# Patient Record
Sex: FEMALE | Race: OTHER | HISPANIC OR LATINO | Employment: OTHER | URBAN - METROPOLITAN AREA
[De-identification: names, ages, dates, MRNs, and addresses within clinical notes are randomized per-mention and may not be internally consistent; named-entity substitution may affect disease eponyms.]

---

## 2020-07-09 ENCOUNTER — TELEPHONE (OUTPATIENT)
Dept: NEUROLOGY | Facility: CLINIC | Age: 50
End: 2020-07-09
Payer: MEDICARE

## 2020-07-09 NOTE — TELEPHONE ENCOUNTER
----- Message from [facility] sent at 7/9/2020  1:09 PM CDT -----  Regarding: Stroke Referral  Good Afternoon,    Dr. Ruffin would like to refer the following patient to Dr. Zamora. The patients diagnosis is cerebral aneurysm. I have scanned the patients referral and records into .      Please let me know if I can help schedule in any way.    Thank you,   [facility] Renu

## 2020-07-17 ENCOUNTER — TELEPHONE (OUTPATIENT)
Dept: NEUROSURGERY | Facility: CLINIC | Age: 50
End: 2020-07-17

## 2020-07-17 NOTE — TELEPHONE ENCOUNTER
I contacted the pt regarding scheduling an appt for evaluation of her newly diagnosis of cerebral aneurysm. I was unable to reach the pt or LM. I scheduled the pt and placed appt slip in the mail.

## 2020-07-27 ENCOUNTER — OFFICE VISIT (OUTPATIENT)
Dept: NEUROSURGERY | Facility: CLINIC | Age: 50
End: 2020-07-27
Payer: MEDICARE

## 2020-07-27 VITALS
HEIGHT: 66 IN | DIASTOLIC BLOOD PRESSURE: 84 MMHG | WEIGHT: 252.19 LBS | TEMPERATURE: 99 F | HEART RATE: 82 BPM | BODY MASS INDEX: 40.53 KG/M2 | SYSTOLIC BLOOD PRESSURE: 133 MMHG

## 2020-07-27 DIAGNOSIS — I67.1 CEREBRAL ANEURYSM, NONRUPTURED: Primary | ICD-10-CM

## 2020-07-27 DIAGNOSIS — I67.1 CEREBRAL ANEURYSM, NONRUPTURED: ICD-10-CM

## 2020-07-27 DIAGNOSIS — I67.1 CEREBRAL ANEURYSM: Primary | ICD-10-CM

## 2020-07-27 DIAGNOSIS — I67.1 CEREBRAL ANEURYSM: ICD-10-CM

## 2020-07-27 DIAGNOSIS — G44.89 OTHER HEADACHE SYNDROME: ICD-10-CM

## 2020-07-27 DIAGNOSIS — I10 ESSENTIAL HYPERTENSION: ICD-10-CM

## 2020-07-27 PROCEDURE — 3008F PR BODY MASS INDEX (BMI) DOCUMENTED: ICD-10-PCS | Mod: CPTII,S$GLB,, | Performed by: PSYCHIATRY & NEUROLOGY

## 2020-07-27 PROCEDURE — 3008F BODY MASS INDEX DOCD: CPT | Mod: CPTII,S$GLB,, | Performed by: PSYCHIATRY & NEUROLOGY

## 2020-07-27 PROCEDURE — 99205 PR OFFICE/OUTPT VISIT, NEW, LEVL V, 60-74 MIN: ICD-10-PCS | Mod: S$GLB,,, | Performed by: PSYCHIATRY & NEUROLOGY

## 2020-07-27 PROCEDURE — 99999 PR PBB SHADOW E&M-EST. PATIENT-LVL III: ICD-10-PCS | Mod: PBBFAC,,, | Performed by: PSYCHIATRY & NEUROLOGY

## 2020-07-27 PROCEDURE — 99999 PR PBB SHADOW E&M-EST. PATIENT-LVL III: CPT | Mod: PBBFAC,,, | Performed by: PSYCHIATRY & NEUROLOGY

## 2020-07-27 PROCEDURE — 99205 OFFICE O/P NEW HI 60 MIN: CPT | Mod: S$GLB,,, | Performed by: PSYCHIATRY & NEUROLOGY

## 2020-07-27 RX ORDER — SULFAMETHOXAZOLE AND TRIMETHOPRIM 800; 160 MG/1; MG/1
1 TABLET ORAL
COMMUNITY

## 2020-07-27 RX ORDER — LISINOPRIL 10 MG/1
TABLET ORAL
COMMUNITY
Start: 2020-07-16

## 2020-07-27 RX ORDER — BUPROPION HYDROCHLORIDE 150 MG/1
TABLET ORAL
COMMUNITY
Start: 2020-07-15

## 2020-07-27 RX ORDER — AMITRIPTYLINE HYDROCHLORIDE 50 MG/1
50 TABLET, FILM COATED ORAL NIGHTLY
COMMUNITY
End: 2020-08-10

## 2020-07-27 RX ORDER — ALPRAZOLAM 0.25 MG/1
0.25 TABLET ORAL 2 TIMES DAILY
COMMUNITY
Start: 2020-07-13

## 2020-07-27 RX ORDER — ESZOPICLONE 3 MG/1
TABLET, FILM COATED ORAL
COMMUNITY
Start: 2020-07-13

## 2020-07-27 RX ORDER — MULTIVIT WITH MINERALS/HERBS
1 TABLET ORAL DAILY
COMMUNITY

## 2020-07-27 RX ORDER — ASPIRIN 81 MG/1
81 TABLET ORAL DAILY
COMMUNITY

## 2020-07-27 RX ORDER — ONDANSETRON 4 MG/1
8 TABLET, ORALLY DISINTEGRATING ORAL
COMMUNITY

## 2020-07-27 RX ORDER — BUTALBITAL, ACETAMINOPHEN AND CAFFEINE 50; 325; 40 MG/1; MG/1; MG/1
TABLET ORAL
COMMUNITY
Start: 2020-07-17

## 2020-07-27 RX ORDER — ATORVASTATIN CALCIUM 20 MG/1
TABLET, FILM COATED ORAL
COMMUNITY
Start: 2020-07-16

## 2020-07-27 NOTE — ASSESSMENT & PLAN NOTE
Unruptured Left ICA aneurysm s/ p embolization with WEB in Feb 2020    - will perform follow up 6 mo cerebral angiogram in Aug 2020.  - rec SBP < 130 mmHg   - risk factor modification

## 2020-07-27 NOTE — LETTER
August 4, 2020      Della Alvarenga NP  [address] [facility] [address]           Keanu mayela - Neurosurgery 7th Fl  [address]  [facility] [address]  Phone: [phone]  Fax: [phone]          Patient: Malena Marie   MR Number: [id number]   YOB: 1970   Date of Visit: 7/27/2020       Dear Della Alvarenga:    Thank you for referring Malena Marie to me for evaluation. Attached you will find relevant portions of my assessment and plan of care.    If you have questions, please do not hesitate to call me. I look forward to following Malena Marie along with you.    Sincerely,    Jorge Luis Zamora MD    Enclosure  CC:  No Recipients    If you would like to receive this communication electronically, please contact [email or web address] or [phone] to request more information on [employer] access.    For providers and/or their staff who would like to refer a patient to [facility], please contact us through our one-stop-shop provider referral line, [facility] [place], at [phone].    If you feel you have received this communication in error or would no longer like to receive these types of communications, please e-mail [email or web address]

## 2020-07-27 NOTE — PROGRESS NOTES
Interventional Neurosurgery  Clinic Note    ___________________  ASSESSMENT & PLAN    Problem List Items Addressed This Visit        Neuro    Cerebral aneurysm, nonruptured    Current Assessment & Plan     Unruptured Left ICA aneurysm s/ p embolization with WEB in Feb 2020    - will perform follow up 6 mo cerebral angiogram in Aug 2020.  - rec SBP < 130 mmHg   - risk factor modification         Other headache syndrome    Current Assessment & Plan     - daily headache     - rec to reduce Fioricet intake to < 4 a month   - will arrange follow up with Headache clinic   - Pt currently getting Botox with Dr. Barrera.                 Cardiac/Vascular    Essential hypertension    Current Assessment & Plan     - rec long term SBP < 130 mmHg  - exercise and mediterranean diet            Other Visit Diagnoses     Cerebral aneurysm    -  Primary    Relevant Orders    IR Angiogram Carotid Cerebral Bilateral inc Arch          Reason For Visit (Chief Complaint): follow up post cerebral aneurysm embolization    HPI: 50 y.o.  female presented to the clinic for follow up post Left ICA aneurysm WEB embolization.      Pt reports having incidental left ICA aneurysm treated by Dr. Soriano @ [place] with WEB device in February, 2020. Pt in clinic today for follow up evaluation.     She reports of daily headache. She has to take Fioricet + Advil daily to help with the headache. She also gets Botox for headache which helps her headache.         Vessel Imaging:    Pt is going to send images for the embolizaed WEB device         Relevant Labwork:        No images available   I reviewed the above labwork.    Review of Systems   Constitutional: Negative for chills and fever.   HENT: Negative for hearing loss and tinnitus.    Eyes: Negative for blurred vision and double vision.   Respiratory: Negative for cough and hemoptysis.    Cardiovascular: Negative for chest pain and palpitations.   Gastrointestinal: Negative for heartburn and  "nausea.   Genitourinary: Negative for dysuria and urgency.   Musculoskeletal: Positive for back pain . Negative for myalgias and neck pain.   Skin: Negative for itching and rash.   Neurological: Negative for dizziness and positive for headaches.   Endo/Heme/Allergies: Negative for environmental allergies. Does not bruise/bleed easily.   Psychiatric/Behavioral: Negative for depression and suicidal ideas.     Past Medical History  Past Medical History:   Diagnosis Date    Hypertension      Family History  No relevant history   Social History  Never Smoker     Medication List with Changes/Refills   Current Medications    ALPRAZOLAM (XANAX) 0.25 MG TABLET    Take 0.25 mg by mouth 2 (two) times daily. as needed for anxiety.    AMITRIPTYLINE (ELAVIL) 50 MG TABLET    Take 50 mg by mouth every evening.    ASCORBIC ACID, VITAMIN C, (VITAMIN C) 100 MG TABLET    Take 100 mg by mouth once daily.    ASPIRIN (ECOTRIN) 81 MG EC TABLET    Take 81 mg by mouth once daily.    ATORVASTATIN (LIPITOR) 20 MG TABLET        B COMPLEX VITAMINS TABLET    Take 1 tablet by mouth once daily.    BUPROPION (WELLBUTRIN XL) 150 MG TB24 TABLET        BUTALBITAL-ACETAMINOPHEN-CAFFEINE -40 MG (FIORICET, ESGIC) -40 MG PER TABLET    TAKE 1 TABLET BY MOUTH EVERY 8 HOURS AS NEEDED FOR HEADACHE MUST LAST 30 DAYS    ESZOPICLONE (LUNESTA) 3 MG TAB    TAKE 1 TABLET BY MOUTH ONCE DAILY AT BEDTIME AS NEEDED FOR INSOMNIA MUST LAST 30 DAYS    LISINOPRIL 10 MG TABLET        ONDANSETRON (ZOFRAN-ODT) 4 MG TBDL    Take 8 mg by mouth every 12 (twelve) hours.    SULFAMETHOXAZOLE-TRIMETHOPRIM 800-160MG (BACTRIM DS) 800-160 MG TAB    Take 1 tablet by mouth every 12 (twelve) hours.       EXAM  Vital Signs:Blood pressure 133/84, pulse 82, temperature 98.6 °F (37 °C), temperature source Temporal, height 5' 6" (1.676 m), weight 114.4 kg (252 lb 3.3 oz).  General: well appearing without discomfort   Mental Status:alert, oriented to person - place - age - month "   Language: able to name, repeat, comprehend commands   Cranial Nerves: EOMI, PERRL, no facial asymmetry, tongue to midline, palate midline  Motor: 5/5 power in all extremities, normal tone  Sensory: intact light touch bilaterally  Coordination: no ataxia   Gait & Stance: normal        Manoj Mijares MD  NeuroIR fellow

## 2020-07-27 NOTE — ASSESSMENT & PLAN NOTE
- daily headache     - rec to reduce Fioricet intake to < 4 a month   - will arrange follow up with Headache clinic   - Pt currently getting Botox with Dr. Barrera.

## 2020-07-28 DIAGNOSIS — I67.1 CEREBRAL ANEURYSM, NONRUPTURED: Primary | ICD-10-CM

## 2020-07-28 DIAGNOSIS — R79.1 ABNORMAL COAGULATION PROFILE: ICD-10-CM

## 2020-07-28 RX ORDER — MIDAZOLAM HYDROCHLORIDE 1 MG/ML
1 INJECTION INTRAMUSCULAR; INTRAVENOUS
Status: CANCELLED | OUTPATIENT
Start: 2020-07-28

## 2020-07-28 RX ORDER — HEPARIN SODIUM 1000 [USP'U]/ML
5000 INJECTION, SOLUTION INTRAVENOUS; SUBCUTANEOUS ONCE
Status: CANCELLED | OUTPATIENT
Start: 2020-07-28 | End: 2020-07-28

## 2020-07-28 RX ORDER — FENTANYL CITRATE 50 UG/ML
50 INJECTION, SOLUTION INTRAMUSCULAR; INTRAVENOUS
Status: CANCELLED | OUTPATIENT
Start: 2020-07-28

## 2020-07-28 NOTE — NURSING
Pre-op call complete.     Pre procedure instructions given for Cerebral angiogram via  (Jose). Pt instructed not to eat or drink after midnight. Allergies reviewed. Pts friend [employer] to provide transport and monitor pt 8 hrs. Home medications reviewed with patient. Pt instructed to check in to second floor radiology/lab desk to have blood work drawn at 10am then to proceed to [facility] waiting area. Pt denied postioning restrictions, HOB flat 2-4 hrs.  Expected length of stay reviewed.  Covid screening complete.  Pt verbalizes understanding. Questions answered.

## 2020-07-29 ENCOUNTER — HOSPITAL ENCOUNTER (OUTPATIENT)
Facility: HOSPITAL | Age: 50
Discharge: HOME | End: 2020-07-29
Attending: PSYCHIATRY & NEUROLOGY | Admitting: PSYCHIATRY & NEUROLOGY
Payer: MEDICARE

## 2020-07-29 VITALS
TEMPERATURE: 99 F | HEIGHT: 66 IN | WEIGHT: 253 LBS | HEART RATE: 64 BPM | OXYGEN SATURATION: 100 % | SYSTOLIC BLOOD PRESSURE: 156 MMHG | RESPIRATION RATE: 18 BRPM | DIASTOLIC BLOOD PRESSURE: 87 MMHG | BODY MASS INDEX: 40.66 KG/M2

## 2020-07-29 DIAGNOSIS — I67.1 CEREBRAL ANEURYSM: ICD-10-CM

## 2020-07-29 PROCEDURE — 63600175 PHARM REV CODE 636 W HCPCS: Performed by: PSYCHIATRY & NEUROLOGY

## 2020-07-29 PROCEDURE — 25500020 PHARM REV CODE 255: Performed by: PSYCHIATRY & NEUROLOGY

## 2020-07-29 PROCEDURE — 25000003 PHARM REV CODE 250: Performed by: PHYSICIAN ASSISTANT

## 2020-07-29 RX ORDER — FENTANYL CITRATE 50 UG/ML
INJECTION, SOLUTION INTRAMUSCULAR; INTRAVENOUS CODE/TRAUMA/SEDATION MEDICATION
Status: COMPLETED | OUTPATIENT
Start: 2020-07-29 | End: 2020-07-29

## 2020-07-29 RX ORDER — SODIUM CHLORIDE 0.9 % (FLUSH) 0.9 %
10 SYRINGE (ML) INJECTION
Status: DISCONTINUED | OUTPATIENT
Start: 2020-07-29 | End: 2020-07-29 | Stop reason: HOSPADM

## 2020-07-29 RX ORDER — MIDAZOLAM HYDROCHLORIDE 1 MG/ML
INJECTION INTRAMUSCULAR; INTRAVENOUS CODE/TRAUMA/SEDATION MEDICATION
Status: COMPLETED | OUTPATIENT
Start: 2020-07-29 | End: 2020-07-29

## 2020-07-29 RX ORDER — SODIUM CHLORIDE 9 MG/ML
INJECTION, SOLUTION INTRAVENOUS CONTINUOUS
Status: DISCONTINUED | OUTPATIENT
Start: 2020-07-29 | End: 2020-07-29 | Stop reason: HOSPADM

## 2020-07-29 RX ORDER — IODIXANOL 320 MG/ML
200 INJECTION, SOLUTION INTRAVASCULAR
Status: COMPLETED | OUTPATIENT
Start: 2020-07-29 | End: 2020-07-29

## 2020-07-29 RX ADMIN — MIDAZOLAM HYDROCHLORIDE 1 MG: 1 INJECTION, SOLUTION INTRAMUSCULAR; INTRAVENOUS at 03:07

## 2020-07-29 RX ADMIN — SODIUM CHLORIDE: 0.9 INJECTION, SOLUTION INTRAVENOUS at 02:07

## 2020-07-29 RX ADMIN — FENTANYL CITRATE 50 MCG: 50 INJECTION, SOLUTION INTRAMUSCULAR; INTRAVENOUS at 03:07

## 2020-07-29 RX ADMIN — IODIXANOL 85 ML: 320 INJECTION, SOLUTION INTRAVASCULAR at 04:07

## 2020-07-29 NOTE — PLAN OF CARE
[facility]  NAD noted.  Report received from Karen Sanders.  ENRICOG to SHARON ennis.  Will continue to monitor.       Spoke with Vivian Soto CNM remotely. Telephone order received with an OK to refill butalbital-acetaminophen  mg  Quantity #30, No refills.   Called patient and left message on VM stating refill has been approved.   Refill called to pharmacy station in [place].

## 2020-07-29 NOTE — H&P
Radiology History & Physical      SUBJECTIVE:     Chief Complaint: follow up cerebral angiogram    History of Present Illness:  Malena Marie is a 50 y.o. female who presents for follow up cerebral angiogram in patient with left ICA aneurysm embolization in 2020.     Past Medical History:   Diagnosis Date    Depression     Hypertension      Past Surgical History:   Procedure Laterality Date    BREAST SURGERY Right     lipoma     SECTION      HYSTERECTOMY      LIPOMA RESECTION      back       Home Meds:   Prior to Admission medications    Medication Sig Start Date End Date Taking? Authorizing Provider   ALPRAZolam (XANAX) 0.25 MG tablet Take 0.25 mg by mouth 2 (two) times daily. as needed for anxiety. 20  Yes Historical Provider, MD   amitriptyline (ELAVIL) 50 MG tablet Take 50 mg by mouth every evening.   Yes Historical Provider, MD   ascorbic acid, vitamin C, (VITAMIN C) 100 MG tablet Take 100 mg by mouth once daily.   Yes Historical Provider, MD   aspirin (ECOTRIN) 81 MG EC tablet Take 81 mg by mouth once daily.   Yes Historical Provider, MD   atorvastatin (LIPITOR) 20 MG tablet  20  Yes Historical Provider, MD   b complex vitamins tablet Take 1 tablet by mouth once daily.   Yes Historical Provider, MD   buPROPion (WELLBUTRIN XL) 150 MG TB24 tablet  7/15/20  Yes Historical Provider, MD   butalbital-acetaminophen-caffeine -40 mg (FIORICET, ESGIC) -40 mg per tablet TAKE 1 TABLET BY MOUTH EVERY 8 HOURS AS NEEDED FOR HEADACHE MUST LAST 30 DAYS 20  Yes Historical Provider, MD   eszopiclone (LUNESTA) 3 mg Tab TAKE 1 TABLET BY MOUTH ONCE DAILY AT BEDTIME AS NEEDED FOR INSOMNIA MUST LAST 30 DAYS 20  Yes Historical Provider, MD   lisinopriL 10 MG tablet  20  Yes Historical Provider, MD   ondansetron (ZOFRAN-ODT) 4 MG TbDL Take 8 mg by mouth every 12 (twelve) hours.   Yes Historical Provider, MD   sulfamethoxazole-trimethoprim 800-160mg (BACTRIM DS) 800-160 mg Tab  Take 1 tablet by mouth every 12 (twelve) hours.   Yes Historical Provider, MD     Anticoagulants/Antiplatelets: no anticoagulation    Allergies: Review of patient's allergies indicates:  No Known Allergies  Sedation History:  no adverse reactions    Review of Systems:   Hematological: no known coagulopathies  Respiratory: no shortness of breath  Cardiovascular: no chest pain  Gastrointestinal: no abdominal pain  Genito-Urinary: no dysuria  Musculoskeletal: negative  Neurological: no TIA or stroke symptoms         OBJECTIVE:     Vital Signs (Most Recent)  Temp: 98.8 °F (37.1 °C) (07/29/20 1411)  Pulse: 74 (07/29/20 1411)  Resp: 18 (07/29/20 1411)  BP: 131/75 (07/29/20 1411)  SpO2: 99 % (07/29/20 1411)    Physical Exam:  ASA: 2  Mallampati: 2    General: no acute distress  Mental Status: alert and oriented to person, place and time  HEENT: normocephalic, atraumatic  Chest: unlabored breathing  Heart: regular heart rate  Abdomen: nondistended  Extremity: moves all extremities    Laboratory  Lab Results   Component Value Date    INR 0.9 07/29/2020       Lab Results   Component Value Date    WBC 6.68 07/29/2020    HGB 14.8 07/29/2020    HCT 45.8 07/29/2020    MCV 96 07/29/2020     07/29/2020      Lab Results   Component Value Date     07/29/2020     07/29/2020    K 4.3 07/29/2020     07/29/2020    CO2 27 07/29/2020    BUN 12 07/29/2020    CREATININE 0.8 07/29/2020    CALCIUM 9.4 07/29/2020    ALT 30 07/29/2020    AST 17 07/29/2020    ALBUMIN 4.2 07/29/2020    BILITOT 0.5 07/29/2020       ASSESSMENT/PLAN:     Sedation Plan: moderate sedation  Patient will undergo : diagnostic cerebral angigoram.    Manoj Mijares MD  NeuroIR fellow.

## 2020-07-29 NOTE — PROCEDURES
Neurointerventional Radiology Post-Procedure Note    Pre Op Diagnosis: Left ICA terminus aneurysm s/p WEB embolization     Post Op Diagnosis: Same as above     Procedure: Cerebral angiogram    Procedure performed by: Noah ADAMS, Jorge Luis Mijares MD.    Written Informed Consent Obtained: Yes    Specimen Removed: NO    Estimated Blood Loss: less than 50     Procedure report:     A 5F sheath was placed into the right femoral artery and a 5F Chaim catheter was advanced into the aortic arch.  The bilateral ICA and vertebral arteries were subselected and angiography of the brain was performed after injection into each of these vessels.    Preliminary interpretation: Previously successfully embolized left ICA terminus aneurysm with WEB device. Please see Imaging report for full details.    A right femoral artery angiogram was performed, the sheath removed and hemostasis achieved using Angisoeal.  No hematoma was present at the time of hemostasis.    The patient tolerated the procedure well.     Manoj Mijares MD  NeuroInterventional Radiology Fellow

## 2020-07-29 NOTE — NURSING
End of cerebral angio gram. Pt tolerated well. Will recover in ROCU. R groin site sealed with angioseal at 1600. HOB flat for 2 hours until 1800

## 2020-07-29 NOTE — DISCHARGE SUMMARY
Radiology Discharge Summary      Hospital Course: No complications    Admit Date: 7/29/2020  Discharge Date: 07/29/2020     Instructions Given to Patient: Yes  Diet: Resume prior diet  Activity: activity as tolerated    Description of Condition on Discharge: Stable  Vital Signs (Most Recent): Temp: 98.8 °F (37.1 °C) (07/29/20 1411)  Pulse: 63 (07/29/20 1600)  Resp: 15 (07/29/20 1600)  BP: (!) 161/87 (07/29/20 1600)  SpO2: 100 % (07/29/20 1600)    Discharge Disposition: Home    Discharge Diagnosis: Previously embolized left ICA terminus aneurysm      Follow-up: @ 18 months for angiogram    Manoj Mijares MD  NeuroIR fellow.

## 2020-07-30 NOTE — PLAN OF CARE
Pt given discharge instructions and handout. Dsg to R groin CDI.  IV d/c'd with cath tip intact.  NAD noted.  Pt to front entrance with transport via wheelchair.

## 2020-07-30 NOTE — DISCHARGE INSTRUCTIONS
For scheduling : Call Elana at [phone]    After hours call radiology resident on call: [phone]    For immediate concerns that are not emergent, you may call our radiology clinic at: [phone]

## 2020-08-05 ENCOUNTER — TELEPHONE (OUTPATIENT)
Dept: NEUROLOGY | Facility: CLINIC | Age: 50
End: 2020-08-05

## 2020-08-05 NOTE — TELEPHONE ENCOUNTER
----- Message from Chhaya Irving sent at 8/5/2020 11:05 AM CDT -----  Contact: self @ [phone]  Pt is scheduled to f/u on 8-24-20 but says she will be travelling to another state on 8[place]16-20.  She is calling to see if the appt can be rescheduled for 8-10-20 while she is here for another appt.  Pls call.

## 2020-08-06 ENCOUNTER — TELEPHONE (OUTPATIENT)
Dept: NEUROSURGERY | Facility: CLINIC | Age: 50
End: 2020-08-06

## 2020-08-06 NOTE — TELEPHONE ENCOUNTER
VIOLA Eddy Staff   Caller: Unspecified (Yesterday,  6:27 PM)             Patient wants appointment change to 8- if possible since she is coming from [place]. I can give her a call if change since she speaks Estonian.   Thank you   taisha

## 2020-08-10 ENCOUNTER — OFFICE VISIT (OUTPATIENT)
Dept: NEUROSURGERY | Facility: CLINIC | Age: 50
End: 2020-08-10
Payer: MEDICARE

## 2020-08-10 ENCOUNTER — OFFICE VISIT (OUTPATIENT)
Dept: NEUROLOGY | Facility: CLINIC | Age: 50
End: 2020-08-10
Payer: MEDICARE

## 2020-08-10 VITALS — WEIGHT: 254 LBS | TEMPERATURE: 98 F | HEIGHT: 66 IN | BODY MASS INDEX: 40.82 KG/M2

## 2020-08-10 VITALS
DIASTOLIC BLOOD PRESSURE: 82 MMHG | BODY MASS INDEX: 40.05 KG/M2 | WEIGHT: 248.13 LBS | HEART RATE: 79 BPM | SYSTOLIC BLOOD PRESSURE: 129 MMHG

## 2020-08-10 DIAGNOSIS — G44.40 MEDICATION OVERUSE HEADACHE: ICD-10-CM

## 2020-08-10 DIAGNOSIS — I10 ESSENTIAL HYPERTENSION: ICD-10-CM

## 2020-08-10 DIAGNOSIS — F41.9 ANXIETY: ICD-10-CM

## 2020-08-10 DIAGNOSIS — J45.909 ASTHMA, UNSPECIFIED ASTHMA SEVERITY, UNSPECIFIED WHETHER COMPLICATED, UNSPECIFIED WHETHER PERSISTENT: ICD-10-CM

## 2020-08-10 DIAGNOSIS — E66.01 CLASS 3 SEVERE OBESITY DUE TO EXCESS CALORIES WITHOUT SERIOUS COMORBIDITY WITH BODY MASS INDEX (BMI) OF 40.0 TO 44.9 IN ADULT: ICD-10-CM

## 2020-08-10 DIAGNOSIS — I67.1 CEREBRAL ANEURYSM: ICD-10-CM

## 2020-08-10 DIAGNOSIS — I67.1 CEREBRAL ANEURYSM, NONRUPTURED: ICD-10-CM

## 2020-08-10 DIAGNOSIS — G47.00 INSOMNIA, UNSPECIFIED TYPE: ICD-10-CM

## 2020-08-10 PROCEDURE — 99203 OFFICE O/P NEW LOW 30 MIN: CPT | Mod: S$GLB,,, | Performed by: PHYSICIAN ASSISTANT

## 2020-08-10 PROCEDURE — 99214 PR OFFICE/OUTPT VISIT, EST, LEVL IV, 30-39 MIN: ICD-10-PCS | Mod: S$GLB,,, | Performed by: PSYCHIATRY & NEUROLOGY

## 2020-08-10 PROCEDURE — 99999 PR PBB SHADOW E&M-EST. PATIENT-LVL IV: CPT | Mod: PBBFAC,,, | Performed by: PHYSICIAN ASSISTANT

## 2020-08-10 PROCEDURE — 99999 PR PBB SHADOW E&M-EST. PATIENT-LVL IV: ICD-10-PCS | Mod: PBBFAC,,, | Performed by: PHYSICIAN ASSISTANT

## 2020-08-10 PROCEDURE — 3008F BODY MASS INDEX DOCD: CPT | Mod: CPTII,S$GLB,, | Performed by: PHYSICIAN ASSISTANT

## 2020-08-10 PROCEDURE — 99214 OFFICE O/P EST MOD 30 MIN: CPT | Mod: S$GLB,,, | Performed by: PSYCHIATRY & NEUROLOGY

## 2020-08-10 PROCEDURE — 99999 PR PBB SHADOW E&M-EST. PATIENT-LVL III: CPT | Mod: PBBFAC,,, | Performed by: PSYCHIATRY & NEUROLOGY

## 2020-08-10 PROCEDURE — 99999 PR PBB SHADOW E&M-EST. PATIENT-LVL III: ICD-10-PCS | Mod: PBBFAC,,, | Performed by: PSYCHIATRY & NEUROLOGY

## 2020-08-10 PROCEDURE — 3008F PR BODY MASS INDEX (BMI) DOCUMENTED: ICD-10-PCS | Mod: CPTII,S$GLB,, | Performed by: PHYSICIAN ASSISTANT

## 2020-08-10 PROCEDURE — 3008F PR BODY MASS INDEX (BMI) DOCUMENTED: ICD-10-PCS | Mod: CPTII,S$GLB,, | Performed by: PSYCHIATRY & NEUROLOGY

## 2020-08-10 PROCEDURE — 99203 PR OFFICE/OUTPT VISIT, NEW, LEVL III, 30-44 MIN: ICD-10-PCS | Mod: S$GLB,,, | Performed by: PHYSICIAN ASSISTANT

## 2020-08-10 PROCEDURE — 3008F BODY MASS INDEX DOCD: CPT | Mod: CPTII,S$GLB,, | Performed by: PSYCHIATRY & NEUROLOGY

## 2020-08-10 RX ORDER — RIZATRIPTAN BENZOATE 5 MG/1
TABLET, ORALLY DISINTEGRATING ORAL
Qty: 12 TABLET | Refills: 3 | Status: SHIPPED | OUTPATIENT
Start: 2020-08-10

## 2020-08-10 RX ORDER — AMITRIPTYLINE HYDROCHLORIDE 25 MG/1
25 TABLET, FILM COATED ORAL NIGHTLY
Qty: 30 TABLET | Refills: 3 | Status: SHIPPED | OUTPATIENT
Start: 2020-08-10 | End: 2021-02-10

## 2020-08-10 NOTE — PATIENT INSTRUCTIONS
Recommendations:   - continue botox  - increased elavil today, consider increasing at future visits  - consider addition of a CGRP inhibitor if fails elavil  - discontinue fioricet and advil due to medication overuse headache  - trial rizatriptan

## 2020-08-10 NOTE — PROGRESS NOTES
Interventional Neurosurgery  Clinic Note    ___________________  ASSESSMENT & PLAN    Problem List Items Addressed This Visit        Neuro    Cerebral aneurysm, nonruptured    Current Assessment & Plan     - unruptured left ICA terminus aneurysm status post web embolization    - six month follow-up angiogram demonstrated excellent embolization of the aneurysm without any residual neck.  - recommend annual follow-up with MRA of the brain with contrast for 3 years.  - Mediterranean diet and weight loss.  - regular exercise.  - strict risk factor modification.            Cardiac/Vascular    Essential hypertension    Current Assessment & Plan     - recommend systolic blood pressure less than 130 mmHg  - exercise and Mediterranean diet  - medication compliance.            Other    Class 3 severe obesity due to excess calories without serious comorbidity with body mass index (BMI) of 40.0 to 44.9 in adult    Current Assessment & Plan     - recommend BMI less than 25  - exercise and Mediterranean diet                 Reason For Visit (Chief Complaint):  Follow-up post cerebral angiogram     HPI: 50 y.o.  female presented to the clinic for follow-up visit post cerebral angiogram.    Patient had unruptured left ICA terminus aneurysm which was treated with web device by Dr. Soriano in [place].  She presented to our clinic for follow-up post embolization.  The angiogram demonstrated excellent embolization of the aneurysm without any residual.  She denies any complaints with the groin.      Vessel Imaging:    Left ICA terminus aneurysm s/p WEB device embolization        Relevant Labwork:        I independently viewed the above imaging studies in addition to reviewing the report.  I reviewed the above labwork.    Review of Systems   Constitutional: Negative for chills and fever.   HENT: Negative for hearing loss and tinnitus.    Eyes: Negative for blurred vision and double vision.   Respiratory: Negative for cough and  hemoptysis.    Cardiovascular: Negative for chest pain and palpitations.   Gastrointestinal: Negative for heartburn and nausea.   Genitourinary: Negative for dysuria and urgency.   Musculoskeletal: Positive for back pain and joint pain. Negative for myalgias and neck pain.   Skin: Negative for itching and rash.   Neurological: Negative for dizziness and headaches.   Endo/Heme/Allergies: Negative for environmental allergies. Does not bruise/bleed easily.   Psychiatric/Behavioral: Negative for depression and suicidal ideas.     Past Medical History  Past Medical History:   Diagnosis Date    Depression     Hypertension      Family History  No relevant history   Social History  Never Smoker     Medication List with Changes/Refills   New Medications    AMITRIPTYLINE (ELAVIL) 25 MG TABLET    Take 1 tablet (25 mg total) by mouth every evening.    RIZATRIPTAN (MAXALT-MLT) 5 MG DISINTEGRATING TABLET    Take at onset of migraine, can repeat in 2 hrs if needed.  No more than 2 tabs per day or 3 days/wk.   Current Medications    ALPRAZOLAM (XANAX) 0.25 MG TABLET    Take 0.25 mg by mouth 2 (two) times daily. as needed for anxiety.    ASCORBIC ACID, VITAMIN C, (VITAMIN C) 100 MG TABLET    Take 100 mg by mouth once daily.    ASPIRIN (ECOTRIN) 81 MG EC TABLET    Take 81 mg by mouth once daily.    ATORVASTATIN (LIPITOR) 20 MG TABLET        B COMPLEX VITAMINS TABLET    Take 1 tablet by mouth once daily.    BUPROPION (WELLBUTRIN XL) 150 MG TB24 TABLET        BUTALBITAL-ACETAMINOPHEN-CAFFEINE -40 MG (FIORICET, ESGIC) -40 MG PER TABLET    TAKE 1 TABLET BY MOUTH EVERY 8 HOURS AS NEEDED FOR HEADACHE MUST LAST 30 DAYS    ESZOPICLONE (LUNESTA) 3 MG TAB    TAKE 1 TABLET BY MOUTH ONCE DAILY AT BEDTIME AS NEEDED FOR INSOMNIA MUST LAST 30 DAYS    LISINOPRIL 10 MG TABLET        ONDANSETRON (ZOFRAN-ODT) 4 MG TBDL    Take 8 mg by mouth every 12 (twelve) hours.    SULFAMETHOXAZOLE-TRIMETHOPRIM 800-160MG (BACTRIM DS) 800-160 MG TAB     "Take 1 tablet by mouth every 12 (twelve) hours.       EXAM  Vital Signs:Temperature 97.5 °F (36.4 °C), temperature source Temporal, height 5' 6" (1.676 m), weight 115.2 kg (254 lb).  General: well appearing without discomfort   Mental Status:alert, oriented to person - place - age - month   Language: able to name, repeat, comprehend commands   Cranial Nerves: EOMI, PERRL, no facial asymmetry, tongue to midline, palate midline  Motor: 5/5 power in all extremities, normal tone  Sensory: intact light touch bilaterally  Coordination: no ataxia   Gait & Stance: normal        Manoj Mijares MD  NeuroIR fellow-          Jorge Luis Zamora MD  Vascular and Interventional Neurology   Director of Comprehensive Stroke Center  Section Head - Vascular Neurology  Departments of Neurology, Neurosurgery and Radiology  [facility] [place] [place]      "

## 2020-08-10 NOTE — PROGRESS NOTES
New Patient     SUBJECTIVE:  Patient ID: Malena Marie   MRN: [id number]  Referred By: Aaareferral Self  Chief Complaint: Headache      History of Present Illness:   50 y.o. female with a PMHx of migraines, HA's, unruptured L ICA aneurysm s/p embolization with WEB Feb 2020, HTN, anxiety, asthma, back pain, fibromyalgia, insomnia, who presents to clinic alone for evaluation of headaches.   PMHx negative for TBI, Meningitis, Kidney Stones, GI bleed, osteoporosis, CAD/MI, CVA/TIA, DM, cancer  Family Hx Positive for mother, brother, paternal and maternal grandmother and grandfather and uncle with migraines    Patient is Yi speaking. H&P assisted w/ the help of an [facility] interpretor.     Patient has been previously seen by Dr. Barrera for her migraines which have been occurring for many years, worsening approx 3 years ago. States her HA's were occurring approximately 20-24/30 days per month with associated photophobia and nausea. She has since started Botox (for over 1.5 years) and she is taking daily fioricet and advil.  Although she reports her HA's have slightly improved, she is here for a 2nd opinion.     Headache History:  Onset - teenager  Location/Radiation - unilateral  temples, occipitalis, frontotemporal  Quality - pressure  Duration - 4 hours - 2 days  Intensity (range) - 1-10/10  Frequency - 12-16/30 ha days per month, 15/30 are debilitating  Triggers - poor sleep, unsure  Aggravating Factors - light, sounds, smells  Alleviating Factors - dark room  Recent Changes - no  Prodrome/Aura - stars  Time of day of most headaches- anytime   Sleep - trouble falling asleep, using lunesta, sometimes snores, wakes feeling refreshed, resolution of headache with sleep    Associated symptoms with the headache: photo/phonophobia, nausea, hyperosmia    Treatments Tried   Elavil  imitrex  fioricet at least 12 days a month  Ibuprofen 3-4 times per week  relpax  zofran     Social History  Alcohol - socially   Smoke - quit  smoking in february  Recreational Drug Use- denies    Current Medications:    Current Outpatient Medications:     ALPRAZolam (XANAX) 0.25 MG tablet, Take 0.25 mg by mouth 2 (two) times daily. as needed for anxiety., Disp: , Rfl:     amitriptyline (ELAVIL) 25 MG tablet, Take 1 tablet (25 mg total) by mouth every evening., Disp: 30 tablet, Rfl: 3    ascorbic acid, vitamin C, (VITAMIN C) 100 MG tablet, Take 100 mg by mouth once daily., Disp: , Rfl:     aspirin (ECOTRIN) 81 MG EC tablet, Take 81 mg by mouth once daily., Disp: , Rfl:     atorvastatin (LIPITOR) 20 MG tablet, , Disp: , Rfl:     b complex vitamins tablet, Take 1 tablet by mouth once daily., Disp: , Rfl:     buPROPion (WELLBUTRIN XL) 150 MG TB24 tablet, , Disp: , Rfl:     butalbital-acetaminophen-caffeine -40 mg (FIORICET, ESGIC) -40 mg per tablet, TAKE 1 TABLET BY MOUTH EVERY 8 HOURS AS NEEDED FOR HEADACHE MUST LAST 30 DAYS, Disp: , Rfl:     eszopiclone (LUNESTA) 3 mg Tab, TAKE 1 TABLET BY MOUTH ONCE DAILY AT BEDTIME AS NEEDED FOR INSOMNIA MUST LAST 30 DAYS, Disp: , Rfl:     lisinopriL 10 MG tablet, , Disp: , Rfl:     ondansetron (ZOFRAN-ODT) 4 MG TbDL, Take 8 mg by mouth every 12 (twelve) hours., Disp: , Rfl:     rizatriptan (MAXALT-MLT) 5 MG disintegrating tablet, Take at onset of migraine, can repeat in 2 hrs if needed.  No more than 2 tabs per day or 3 days/wk., Disp: 12 tablet, Rfl: 3    sulfamethoxazole-trimethoprim 800-160mg (BACTRIM DS) 800-160 mg Tab, Take 1 tablet by mouth every 12 (twelve) hours., Disp: , Rfl:     Review of Systems - as per HPI, otherwise a balanced 10 systems review is negative.    OBJECTIVE:  Vitals:  /82   Pulse 79   Wt 112.5 kg (248 lb 2 oz)   BMI 40.05 kg/m²     Physical Exam   Constitutional: she appears well-developed and well-nourished. she is well groomed. NAD  HENT:    Head: Normocephalic and atraumatic.  Frontalis was NTTP, temporalis was NTTP   Eyes: Conjunctivae and EOM are normal.  Pupils are equal, round, and reactive to light   Neck: Neck supple. Occiput and trapezius NTTP   Musculoskeletal: Normal range of motion. No joint stiffness. No vertebral point tenderness.  Skin: Skin is warm and dry.  Psychiatric: Normal mood and affect.     Neuro exam:    Mental status:  The patient is alert and oriented to person, place and time.  Language is intact and fluent  Remote and recent memory are intact  Normal attention and concentration  Mood is stable    Cranial Nerves:  Fundoscopic examination does not reveal any occult papilledema.    Pupils are equal and reactive to light.    Extraocular movements are intact and without nystagmus.    Visual fields are full to confrontation testing.   Facial movement is symmetric.  Facial sensation is intact.    Hearing is intact  FROM of neck in all (6) directions without pain  Shoulder shrug symmetrical.    Coordination:     Finger to nose - normal and symmetric bilaterally   Heel to shin test - normal and symmetric bilaterally     Motor:  Normal muscle bulk and symmetry. No fasciculations were noted.   Tremor not apparent   Pronator drift not apparent.    strength was strong and symmetric  Finger extension strength was strong and symmetric  RUE:appropriate against gravity and medium force as tested 5/5  LUE: appropriate against gravity and medium force as tested 5/5  RLE:appropriate against gravity and medium force as tested 5/5              LLE: appropriate against gravity and medium force as tested 5/5    Reflexes:  Right Brachioradialis 2+  Left Brachioradialis 2+  Right Biceps 2+  Left Biceps 2+  Right Patellar2+  Left Patellar 2+  Right Achilles 2+  Left Achilles 2+                          Marques was negative    Sensory:  RUE  intact light touch  LUE intact light touch  RLE intact light touch  LLE intact light touch    Gait:   Romberg - negative  Normal gait  Tandem, Heel, and Toe Walk - able to perform without difficulty    Review of Data:   Notes  from VN reviewed   Labs:  Lab Visit on 07/29/2020   Component Date Value Ref Range Status    Sodium 07/29/2020 140  136 - 145 mmol/L Final    Potassium 07/29/2020 4.3  3.5 - 5.1 mmol/L Final    Chloride 07/29/2020 107  95 - 110 mmol/L Final    CO2 07/29/2020 27  23 - 29 mmol/L Final    Glucose 07/29/2020 102  70 - 110 mg/dL Final    BUN, Bld 07/29/2020 12  6 - 20 mg/dL Final    Creatinine 07/29/2020 0.8  0.5 - 1.4 mg/dL Final    Calcium 07/29/2020 9.4  8.7 - 10.5 mg/dL Final    Total Protein 07/29/2020 7.9  6.0 - 8.4 g/dL Final    Albumin 07/29/2020 4.2  3.5 - 5.2 g/dL Final    Total Bilirubin 07/29/2020 0.5  0.1 - 1.0 mg/dL Final    Alkaline Phosphatase 07/29/2020 71  55 - 135 U/L Final    AST 07/29/2020 17  10 - 40 U/L Final    ALT 07/29/2020 30  10 - 44 U/L Final    Anion Gap 07/29/2020 6* 8 - 16 mmol/L Final    eGFR if African American 07/29/2020 >60.0  >60 mL/min/1.73 m^2 Final    eGFR if non African American 07/29/2020 >60.0  >60 mL/min/1.73 m^2 Final    WBC 07/29/2020 6.68  3.90 - 12.70 K/uL Final    RBC 07/29/2020 4.76  4.00 - 5.40 M/uL Final    Hemoglobin 07/29/2020 14.8  12.0 - 16.0 g/dL Final    Hematocrit 07/29/2020 45.8  37.0 - 48.5 % Final    Mean Corpuscular Volume 07/29/2020 96  82 - 98 fL Final    Mean Corpuscular Hemoglobin 07/29/2020 31.1* 27.0 - 31.0 pg Final    Mean Corpuscular Hemoglobin Conc 07/29/2020 32.3  32.0 - 36.0 g/dL Final    RDW 07/29/2020 13.4  11.5 - 14.5 % Final    Platelets 07/29/2020 238  150 - 350 K/uL Final    MPV 07/29/2020 11.1  9.2 - 12.9 fL Final    Immature Granulocytes 07/29/2020 0.1  0.0 - 0.5 % Final    Gran # (ANC) 07/29/2020 3.1  1.8 - 7.7 K/uL Final    Immature Grans (Abs) 07/29/2020 0.01  0.00 - 0.04 K/uL Final    Lymph # 07/29/2020 2.9  1.0 - 4.8 K/uL Final    Mono # 07/29/2020 0.5  0.3 - 1.0 K/uL Final    Eos # 07/29/2020 0.2  0.0 - 0.5 K/uL Final    Baso # 07/29/2020 0.06  0.00 - 0.20 K/uL Final    nRBC 07/29/2020 0  0 /100 WBC  Final    Gran% 07/29/2020 46.2  38.0 - 73.0 % Final    Lymph% 07/29/2020 42.8  18.0 - 48.0 % Final    Mono% 07/29/2020 6.7  4.0 - 15.0 % Final    Eosinophil% 07/29/2020 3.3  0.0 - 8.0 % Final    Basophil% 07/29/2020 0.9  0.0 - 1.9 % Final    Differential Method 07/29/2020 Automated   Final    Prothrombin Time 07/29/2020 9.9  9.0 - 12.5 sec Final    INR 07/29/2020 0.9  0.8 - 1.2 Final     Imaging:  from [facility] in [place], located in [place] tab    MRI brain 12/2019  Impression:   No acute findings.  Small chronic ischemic lacunar infarct at the upper caudate head on the right. Minimal degree of chronic microangiopathy of the frontal parietal white matter. Rounded prominence of the terminal internal carotid artery at the bifurcation on the left suspicious for cerebral aneurysm.     CTA 12/2019  Impression:   Left ICA terminus aneurysm with broad neck measuring 4x5mm. The neck measures approx 4mm.     Note: I have independently reviewed any/all imaging/labs/tests and agree with the report (s) as documented.  Any discrepancies will be as noted/demarcated by free text.  POLO GRACE 8/10/2020    ASSESSMENT:  1. Chronic migraine    2. Medication overuse headache    3. Cerebral aneurysm    4. Essential hypertension    5. Anxiety    6. Asthma, unspecified asthma severity, unspecified whether complicated, unspecified whether persistent    7. Insomnia, unspecified type          PLAN:  - Discussed symptoms appear to be consistent with migraines and med overuse HA, discussed treatment options and patient agreed with the following plan:  - patient prefers to continue care with Dr. Barrera but would like me to continue to follow to provide a second opinion on treatment plan  - ppx - continue botox (next planned for sept however has a trip planned so will obtain in oct) with Dr. Barrera, increase elavil  - abortive - trial maxalt  - med overuse HA- d/c fioricet and advil  - cerebral aneursym - mgmt per nsgy  - avoid bb -  hx of asthma  - HTN - stable, continue lisinopril, mgmt per pcp  - insomnia - continue lunesta, mgmt per pcp  - anxiety - continue xanax, mgmt per pcp  - risks, benefits, and potential side effects of elavil, maxalt discussed   - alternative treatment options offered   - importance of healthy diet, regular exercise and sleep hygiene in the treatment of headaches    - Start tracking headaches via Migraine Slava clary on phone   - RTC in 6 mo     Orders Placed This Encounter    amitriptyline (ELAVIL) 25 MG tablet    rizatriptan (MAXALT-MLT) 5 MG disintegrating tablet       I have discussed realistic goals of care with patient at length as well as medication options, and need for lifestyle adjustment. I have explained that treatment will take time. We have agreed that the goal will be to reduce frequency/intensity/quantity of HA, not to be completely HA free. I have explained my non narcotic policy regarding headache treatment.    Patient agreeable to work on lifestyle adjustments.    Questions and concerns were sought and answered to the patient's stated verbal satisfaction.  The patient verbalizes understanding and agreement with the above stated treatment plan.     CC: MD Esperanza Orozco PA-C  [facility]  [phone]    Dr. Schaefer was available during today's encounter.

## 2020-08-12 PROBLEM — E66.01 CLASS 3 SEVERE OBESITY DUE TO EXCESS CALORIES WITHOUT SERIOUS COMORBIDITY WITH BODY MASS INDEX (BMI) OF 40.0 TO 44.9 IN ADULT: Status: ACTIVE | Noted: 2020-08-12

## 2020-08-12 NOTE — ASSESSMENT & PLAN NOTE
- recommend systolic blood pressure less than 130 mmHg  - exercise and Mediterranean diet  - medication compliance.

## 2020-08-12 NOTE — ASSESSMENT & PLAN NOTE
- unruptured left ICA terminus aneurysm status post web embolization    - six month follow-up angiogram demonstrated excellent embolization of the aneurysm without any residual neck.  - recommend annual follow-up with MRA of the brain with contrast for 3 years.  - Mediterranean diet and weight loss.  - regular exercise.  - strict risk factor modification.

## 2021-01-21 ENCOUNTER — TELEPHONE (OUTPATIENT)
Dept: NEUROLOGY | Facility: CLINIC | Age: 51
End: 2021-01-21

## 2021-02-10 ENCOUNTER — OFFICE VISIT (OUTPATIENT)
Dept: NEUROLOGY | Facility: CLINIC | Age: 51
End: 2021-02-10
Payer: MEDICARE

## 2021-02-10 VITALS
BODY MASS INDEX: 40.05 KG/M2 | DIASTOLIC BLOOD PRESSURE: 97 MMHG | HEART RATE: 84 BPM | HEIGHT: 66 IN | SYSTOLIC BLOOD PRESSURE: 156 MMHG

## 2021-02-10 VITALS
DIASTOLIC BLOOD PRESSURE: 90 MMHG | WEIGHT: 248 LBS | BODY MASS INDEX: 40.03 KG/M2 | SYSTOLIC BLOOD PRESSURE: 137 MMHG | HEART RATE: 96 BPM

## 2021-02-10 DIAGNOSIS — Z86.79 S/P ANEURYSM REPAIR: Primary | ICD-10-CM

## 2021-02-10 DIAGNOSIS — Z98.890 S/P ANEURYSM REPAIR: Primary | ICD-10-CM

## 2021-02-10 PROCEDURE — 3080F DIAST BP >= 90 MM HG: CPT | Mod: CPTII,S$GLB,, | Performed by: NURSE PRACTITIONER

## 2021-02-10 PROCEDURE — 3008F BODY MASS INDEX DOCD: CPT | Mod: CPTII,S$GLB,, | Performed by: NURSE PRACTITIONER

## 2021-02-10 PROCEDURE — 1126F AMNT PAIN NOTED NONE PRSNT: CPT | Mod: S$GLB,,, | Performed by: PHYSICIAN ASSISTANT

## 2021-02-10 PROCEDURE — 3080F PR MOST RECENT DIASTOLIC BLOOD PRESSURE >= 90 MM HG: ICD-10-PCS | Mod: CPTII,S$GLB,, | Performed by: PHYSICIAN ASSISTANT

## 2021-02-10 PROCEDURE — 99214 PR OFFICE/OUTPT VISIT, EST, LEVL IV, 30-39 MIN: ICD-10-PCS | Mod: S$GLB,,, | Performed by: NURSE PRACTITIONER

## 2021-02-10 PROCEDURE — 99214 OFFICE O/P EST MOD 30 MIN: CPT | Mod: S$GLB,,, | Performed by: PHYSICIAN ASSISTANT

## 2021-02-10 PROCEDURE — 3008F PR BODY MASS INDEX (BMI) DOCUMENTED: ICD-10-PCS | Mod: CPTII,S$GLB,, | Performed by: NURSE PRACTITIONER

## 2021-02-10 PROCEDURE — 99999 PR PBB SHADOW E&M-EST. PATIENT-LVL III: ICD-10-PCS | Mod: PBBFAC,,, | Performed by: PHYSICIAN ASSISTANT

## 2021-02-10 PROCEDURE — 3008F PR BODY MASS INDEX (BMI) DOCUMENTED: ICD-10-PCS | Mod: CPTII,S$GLB,, | Performed by: PHYSICIAN ASSISTANT

## 2021-02-10 PROCEDURE — 99499 RISK ADDL DX/OHS AUDIT: ICD-10-PCS | Mod: S$GLB,,, | Performed by: NURSE PRACTITIONER

## 2021-02-10 PROCEDURE — 1126F PR PAIN SEVERITY QUANTIFIED, NO PAIN PRESENT: ICD-10-PCS | Mod: S$GLB,,, | Performed by: PHYSICIAN ASSISTANT

## 2021-02-10 PROCEDURE — 3075F PR MOST RECENT SYSTOLIC BLOOD PRESS GE 130-139MM HG: ICD-10-PCS | Mod: CPTII,S$GLB,, | Performed by: PHYSICIAN ASSISTANT

## 2021-02-10 PROCEDURE — 3080F DIAST BP >= 90 MM HG: CPT | Mod: CPTII,S$GLB,, | Performed by: PHYSICIAN ASSISTANT

## 2021-02-10 PROCEDURE — 99999 PR PBB SHADOW E&M-EST. PATIENT-LVL III: CPT | Mod: PBBFAC,,, | Performed by: PHYSICIAN ASSISTANT

## 2021-02-10 PROCEDURE — 99214 OFFICE O/P EST MOD 30 MIN: CPT | Mod: S$GLB,,, | Performed by: NURSE PRACTITIONER

## 2021-02-10 PROCEDURE — 3074F PR MOST RECENT SYSTOLIC BLOOD PRESSURE < 130 MM HG: ICD-10-PCS | Mod: CPTII,S$GLB,, | Performed by: NURSE PRACTITIONER

## 2021-02-10 PROCEDURE — 3075F SYST BP GE 130 - 139MM HG: CPT | Mod: CPTII,S$GLB,, | Performed by: PHYSICIAN ASSISTANT

## 2021-02-10 PROCEDURE — 3008F BODY MASS INDEX DOCD: CPT | Mod: CPTII,S$GLB,, | Performed by: PHYSICIAN ASSISTANT

## 2021-02-10 PROCEDURE — 1126F PR PAIN SEVERITY QUANTIFIED, NO PAIN PRESENT: ICD-10-PCS | Mod: S$GLB,,, | Performed by: NURSE PRACTITIONER

## 2021-02-10 PROCEDURE — 99499 UNLISTED E&M SERVICE: CPT | Mod: S$GLB,,, | Performed by: NURSE PRACTITIONER

## 2021-02-10 PROCEDURE — 99214 PR OFFICE/OUTPT VISIT, EST, LEVL IV, 30-39 MIN: ICD-10-PCS | Mod: S$GLB,,, | Performed by: PHYSICIAN ASSISTANT

## 2021-02-10 PROCEDURE — 3074F SYST BP LT 130 MM HG: CPT | Mod: CPTII,S$GLB,, | Performed by: NURSE PRACTITIONER

## 2021-02-10 PROCEDURE — 99999 PR PBB SHADOW E&M-EST. PATIENT-LVL III: ICD-10-PCS | Mod: PBBFAC,,, | Performed by: NURSE PRACTITIONER

## 2021-02-10 PROCEDURE — 3080F PR MOST RECENT DIASTOLIC BLOOD PRESSURE >= 90 MM HG: ICD-10-PCS | Mod: CPTII,S$GLB,, | Performed by: NURSE PRACTITIONER

## 2021-02-10 PROCEDURE — 1126F AMNT PAIN NOTED NONE PRSNT: CPT | Mod: S$GLB,,, | Performed by: NURSE PRACTITIONER

## 2021-02-10 PROCEDURE — 99999 PR PBB SHADOW E&M-EST. PATIENT-LVL III: CPT | Mod: PBBFAC,,, | Performed by: NURSE PRACTITIONER

## 2021-02-10 RX ORDER — AMITRIPTYLINE HYDROCHLORIDE 25 MG/1
50 TABLET, FILM COATED ORAL NIGHTLY
Qty: 60 TABLET | Refills: 2 | Status: SHIPPED | OUTPATIENT
Start: 2021-02-10 | End: 2021-05-11

## 2021-09-02 ENCOUNTER — PATIENT MESSAGE (OUTPATIENT)
Dept: NEUROLOGY | Facility: CLINIC | Age: 51
End: 2021-09-02

## 2021-09-03 ENCOUNTER — PATIENT MESSAGE (OUTPATIENT)
Dept: NEUROLOGY | Facility: CLINIC | Age: 51
End: 2021-09-03

## 2024-04-26 ENCOUNTER — TELEPHONE (OUTPATIENT)
Dept: NEUROLOGY | Facility: CLINIC | Age: 54
End: 2024-04-26
Payer: MEDICARE

## 2024-04-26 NOTE — TELEPHONE ENCOUNTER
LVM advising patient call back to schedule follow up with Vanita Pollack NP. Will tentatively schedule patient 5/07 1200 with Vanita Pollack NP and send appointment letter in mail.

## 2024-04-26 NOTE — TELEPHONE ENCOUNTER
----- Message from Bradfordemery Cueva MA sent at 3/26/2024  4:23 PM CDT -----  Regarding: FW: Referral  Can you help schedule this patient with Vanita hall?  ----- Message -----  From: Vanita Pollack NP  Sent: 3/26/2024   6:46 AM CDT  To: Bradford Cueva MA  Subject: RE: Referral                                     Yes, Ill see that  ----- Message -----  From: Bradford Cueva MA  Sent: 3/25/2024   4:21 PM CDT  To: Vanita Pollack NP  Subject: FW: Referral                                     Okay to see?  ----- Message -----  From: Esperanza Ramon PA-C  Sent: 3/25/2024   8:21 AM CDT  To: Bradford Cueva MA  Subject: RE: Referral                                     Per referral her current neurologist wants her to follow up with a a neurointerventionalist or vascular neurologist regarding her history of cerebral aneurysm. Pt hasn't had imaging of that aneurysm or any f/up in some time.   So needs to see someone for this.   ----- Message -----  From: Bradford Cueva MA  Sent: 3/22/2024   4:22 PM CDT  To: Esperanza Ramon PA-C  Subject: FW: Referral                                     Appropriate to resume and see? Last seen 2021  ----- Message -----  From: Elizabeth Geronimo  Sent: 3/22/2024   4:16 PM CDT  To: Tristen Mata Staff  Subject: Referral                                         Good morning,    Dr. Carmen Barrera would like to refer the following patient to a Neuro Interventionist. The patients diagnosis is Chronic migraine w/o aura, not intractable, w/o status migrainosus. I have scanned the patients referral and records into .    Pt is established in Neurology, having previously seen Dr. Ramon.     ThanksElizabeth  [facility]

## 2024-05-07 ENCOUNTER — OFFICE VISIT (OUTPATIENT)
Dept: NEUROLOGY | Facility: CLINIC | Age: 54
End: 2024-05-07
Payer: MEDICARE

## 2024-05-07 VITALS
DIASTOLIC BLOOD PRESSURE: 81 MMHG | HEART RATE: 86 BPM | WEIGHT: 248 LBS | SYSTOLIC BLOOD PRESSURE: 117 MMHG | HEIGHT: 66 IN | BODY MASS INDEX: 39.86 KG/M2

## 2024-05-07 DIAGNOSIS — I10 ESSENTIAL HYPERTENSION: ICD-10-CM

## 2024-05-07 DIAGNOSIS — I67.1 CEREBRAL ANEURYSM, NONRUPTURED: Primary | ICD-10-CM

## 2024-05-07 PROCEDURE — 3074F SYST BP LT 130 MM HG: CPT | Mod: CPTII,S$GLB,, | Performed by: NURSE PRACTITIONER

## 2024-05-07 PROCEDURE — 3008F BODY MASS INDEX DOCD: CPT | Mod: CPTII,S$GLB,, | Performed by: NURSE PRACTITIONER

## 2024-05-07 PROCEDURE — 4010F ACE/ARB THERAPY RXD/TAKEN: CPT | Mod: CPTII,S$GLB,, | Performed by: NURSE PRACTITIONER

## 2024-05-07 PROCEDURE — 99999 PR PBB SHADOW E&M-EST. PATIENT-LVL III: CPT | Mod: PBBFAC,,, | Performed by: NURSE PRACTITIONER

## 2024-05-07 PROCEDURE — 3079F DIAST BP 80-89 MM HG: CPT | Mod: CPTII,S$GLB,, | Performed by: NURSE PRACTITIONER

## 2024-05-07 PROCEDURE — 1159F MED LIST DOCD IN RCRD: CPT | Mod: CPTII,S$GLB,, | Performed by: NURSE PRACTITIONER

## 2024-05-07 PROCEDURE — 99205 OFFICE O/P NEW HI 60 MIN: CPT | Mod: S$GLB,,, | Performed by: NURSE PRACTITIONER

## 2024-05-07 PROCEDURE — 1160F RVW MEDS BY RX/DR IN RCRD: CPT | Mod: CPTII,S$GLB,, | Performed by: NURSE PRACTITIONER

## 2024-05-07 RX ORDER — AMLODIPINE AND BENAZEPRIL HYDROCHLORIDE 5; 10 MG/1; MG/1
1 CAPSULE ORAL DAILY
COMMUNITY
Start: 2024-01-29

## 2024-05-07 NOTE — PROGRESS NOTES
[facility] VISIT     Patient's preferred language is Maltese, however she declined the help of Telehealth     SUBJECTIVE:    History for Present Illness: Malena Marie is a 53 y.o.  female  with PMHx of unruptured cerebral aneurysm (s/p embolization), HTN and migraine presents to me today for follow-up.    Relevant HPI:  Patient initially presented to clinic on 02/10/2021 for follow-up after unruptured left ICA terminus aneurysm repair with web device by Dr. Soriano in [place].  She was last seen in clinic by Dr. Zamora on 08/10/2020 for follow-up angiography, which demonstrated excellent embolization of the aneurysm without residual flow.  She was followed by the headache Clinic for chronic migraines      Interval history:    At the time of today's visit, the patient denies new or worsening focal neurologic symptoms concerning for new stroke or TIA. She is doing well overall .  She reports chronic migraines (currently treated with Fioricet and Botox by Neurology in [place]). She denies associated vertigo, double vision, focal weakness or numbness, gait imbalance,  language or visual disturbances.  She is independent with ADLs.  She smokes 0.5 PPD; denies alcohol consumption; denies recreational drug use.  Patient is adherent with home medications.        Past Medical History:   Diagnosis Date    Depression     Hypertension      Past Surgical History:   Procedure Laterality Date    BREAST SURGERY Right     lipoma    CEREBRAL ANGIOGRAM N/A 2020    Procedure: ANGIOGRAM-CEREBRAL;  Surgeon: Tc Diagnostic Provider;  Location: [facility] OR [address] [place] [address];  Service: Radiology;  Laterality: N/A;  /Noah     SECTION      HYSTERECTOMY      LIPOMA RESECTION      back     No family history on file.     Current Outpatient Medications:     ALPRAZolam (XANAX) 0.25 MG tablet, Take 0.25 mg by mouth 2 (two) times daily. as needed for anxiety., Disp: , Rfl:      "amlodipine-benazepril 5-10 mg (LOTREL) 5-10 mg per capsule, Take 1 capsule by mouth once daily., Disp: , Rfl:     ascorbic acid, vitamin C, (VITAMIN C) 100 MG tablet, Take 100 mg by mouth once daily., Disp: , Rfl:     aspirin (ECOTRIN) 81 MG EC tablet, Take 81 mg by mouth once daily., Disp: , Rfl:     atorvastatin (LIPITOR) 20 MG tablet, , Disp: , Rfl:     b complex vitamins tablet, Take 1 tablet by mouth once daily., Disp: , Rfl:     buPROPion (WELLBUTRIN XL) 150 MG TB24 tablet, , Disp: , Rfl:     butalbital-acetaminophen-caffeine -40 mg (FIORICET, ESGIC) -40 mg per tablet, TAKE 1 TABLET BY MOUTH EVERY 8 HOURS AS NEEDED FOR HEADACHE MUST LAST 30 DAYS, Disp: , Rfl:     eszopiclone (LUNESTA) 3 mg Tab, TAKE 1 TABLET BY MOUTH ONCE DAILY AT BEDTIME AS NEEDED FOR INSOMNIA MUST LAST 30 DAYS, Disp: , Rfl:     ondansetron (ZOFRAN-ODT) 4 MG TbDL, Take 8 mg by mouth every 12 (twelve) hours., Disp: , Rfl:     rizatriptan (MAXALT-MLT) 5 MG disintegrating tablet, Take at onset of migraine, can repeat in 2 hrs if needed.  No more than 2 tabs per day or 3 days/wk., Disp: 12 tablet, Rfl: 3    sulfamethoxazole-trimethoprim 800-160mg (BACTRIM DS) 800-160 mg Tab, Take 1 tablet by mouth every 12 (twelve) hours., Disp: , Rfl:     amitriptyline (ELAVIL) 25 MG tablet, Take 2 tablets (50 mg total) by mouth every evening., Disp: 60 tablet, Rfl: 2    lisinopriL 10 MG tablet, , Disp: , Rfl:      Review of Systems:  Review of systems is negative except for the symptoms mentioned in HPI    Physical exam:    /81 (BP Location: Right arm, Patient Position: Sitting, BP Method: Large (Automatic))   Pulse 86   Ht 5' 6" (1.676 m)   Wt 112.5 kg (248 lb 0.3 oz)   BMI 40.03 kg/m²     General: Well-developed, well-groomed. No apparent distress  HENT: Normocephalic, atraumatic.    Cardiovascular: Regular rate and rhythm  Chest: No audible wheezes, stridor, ronchi appreciated.  Musculoskeletal: No peripheral edema     Neurologic Exam: " The patient is awake, alert and oriented to person, place, time and situation. Attentive, vigilant during exam. Language is fluent. Naming & repetition intact, +2-step commands.  Fund of knowledge is appropriate. Well organized thoughts.     Cranial nerves:   CN II: Visual fields are full to confrontation. Pupils are 4 mm and briskly reactive to light.  CN III, IV, VI: EOMI, no nystagmus, no ptosis  CN V: Facial sensation is intact in all 3 divisions bilaterally.  CN VII: Face is symmetric with normal eye closure and smile.  CN VIII: Hearing is normal bilaterally  CN IX, X: Palate elevates symmetrically. Phonation is normal.  CN XI: Head turning and shoulder shrug are intact  CN XII: Tongue is midline with normal movements and no atrophy.     Motor examination of all extremities demonstrates normal bulk and tone in all four limbs. There are no atrophy or fasciculations.      Sensory examination is normal light touch in BUE and BLE.  Romberg is negative.  Deep tendon reflexes No clonus. Negative Marques's     Gait: Normal tandem, and casual gait.     Coordination:  Rapid alternating movements and fine finger movements are intact.         Relevant Labwork:        Diagnostic Results:  Imaging was independently reviewed by myself, along with the associated radiology report    Brain Imaging     Vessel Imaging   IR angiogram 07/29/2020:  Successfully embolized unruptured left ICA terminus aneurysm using web device is seen on the follow-up angiogram.  Luminal irregularity bilateral internal carotid artery high cervical segment potentially fibromuscular dysplasia  Cardiac Imaging     Assessment:  1. Cerebral aneurysm, nonruptured  MRA Brain with and without contrast      2. Essential hypertension          Malena Marie  is a 53 y.o.  female  seen today in clinic for follow-up assessment and recommendations. The following recommendations and plan were discussed in depth with the patient who voiced understanding and was  in agreement.  Plan:  History of unruptured cerebral aneurysm  -S/P Successfully embolized unruptured left ICA terminus aneurysm  -follow-up angiogram in July of 2020 indicated no residual flow in previously repaired left ICA terminus aneurysm  -Hypertension: Long term goal is normotension w/ target BP of less than 130/80 mmHg.  Continue home medications and follow up with PCP for surveillance and long-term management  Tobacco dependency: Counseled on smoking cessation.  Tobacco dependency will increase your future stroke risk and elevated your BP. Patient current daily smoker.  Patient encouraged to work towards cessation  - Diagnostics ordered/pending:  Plan for MRA of the brain with and without to reassess previously embolized left ICA terminus aneurysm  -Diet: Discussed Mediterranean Diet recommendations (Adopted from Martin et al, [facility], 2018.)  - Eat primarily plant-based foods, such as fruits and vegetables, whole grains, legumes (beans) and nuts  - Limit refined carbohydrates (white pasta, bread, rice).  - Replace butter with healthy fats such as olive oil.  - Use herbs and spices instead of salt to flavor foods.  - Limit red meat and processed meats to no more than a few times a month.  - Avoid sugary sodas, bakery goods, and sweets.  - Eat fish and poultry at least twice a week.              - Get plenty of exercise (150 minutes per week).    Patient/Family teaching provided during this visit  -Identifying the signs and symptoms of stroke; emergency action and ER attention  -Risk factor control  -Optimization for secondary stroke prevention including compliance with current medications   -Discussed that medication/lifestyle modifications are preventative,and nothing is absolute.     Questions and concerns were answered to the patient's stated verbal satisfaction. The patient verbalizes understanding and agreement with the above stated treatment plan.      I will plan on having Malena Marie return to  clinic following MRA. The patient can contact my office with any questions or concerns they may have as they arise in the interim.       Collaborating Physician, Dr Zamora, was available during today's encounter. Any change to plan along with cosign to appear in the EMR    LAMAR Dixon  Department of Vascular Neurology  [facility]  [phone]    This note is dictated on M*Modal Fluency Direct word recognition program. There are word recognition mistakes that are occasionally missed on review